# Patient Record
Sex: FEMALE | Race: BLACK OR AFRICAN AMERICAN | URBAN - NONMETROPOLITAN AREA
[De-identification: names, ages, dates, MRNs, and addresses within clinical notes are randomized per-mention and may not be internally consistent; named-entity substitution may affect disease eponyms.]

---

## 2018-08-21 PROBLEM — Z96.1: Noted: 2018-08-21

## 2018-08-21 PROBLEM — H31.013: Noted: 2018-08-21

## 2018-08-21 PROBLEM — H40.1134: Noted: 2018-08-21

## 2019-06-11 ENCOUNTER — IMPORTED ENCOUNTER (OUTPATIENT)
Dept: URBAN - NONMETROPOLITAN AREA CLINIC 1 | Facility: CLINIC | Age: 74
End: 2019-06-11

## 2019-06-11 PROCEDURE — 92134 CPTRZ OPH DX IMG PST SGM RTA: CPT

## 2019-06-11 NOTE — PATIENT DISCUSSION
s/p PCIOL-doing wellPOAG-IOP 21:21-OCT Virgil Tao 74 ordered 8/21/18 performed and reviewed with patient stable-watch OS ?  inferior notch/thinning-stopped Alphagan 2/19/18 possible allergic rxn with itch and redness-good IOP off of gtts continue to monitor-HVF 24-2 performed and reviewed with the patient. & OCT MAC

## 2019-06-18 ENCOUNTER — IMPORTED ENCOUNTER (OUTPATIENT)
Dept: URBAN - NONMETROPOLITAN AREA CLINIC 1 | Facility: CLINIC | Age: 74
End: 2019-06-18

## 2019-06-18 PROBLEM — Z96.1: Noted: 2019-06-18

## 2019-06-18 PROBLEM — H31.013: Noted: 2019-06-18

## 2019-06-18 PROBLEM — H35.373: Noted: 2019-06-18

## 2019-06-18 PROBLEM — H40.1134: Noted: 2019-06-18

## 2019-06-18 PROCEDURE — 92134 CPTRZ OPH DX IMG PST SGM RTA: CPT

## 2019-06-18 PROCEDURE — 92014 COMPRE OPH EXAM EST PT 1/>: CPT

## 2019-06-18 NOTE — PATIENT DISCUSSION
Macular Scars OU-OCT MAC reviewed w/pt-Continue to monitorERM OU-Discussed in detail w/pt-Continue to monitor. s/p PCIOL OU w/PCO -doing wellPOAG-IOP 20:20 06/18/19-watch OS ?  inferior notch/thinning-good IOP off of gtts continue to monitor-Order OCT ONH [id number]: 6 Mo IOP check VF OCT ONH Pach

## 2020-09-04 NOTE — PATIENT DISCUSSION
- Will refer to Dr. Angeles Beal for a careful exam in light of high myopia and history of complicated RD OS.  May need barrier laser prophylaxis

## 2020-09-04 NOTE — PATIENT DISCUSSION
- Presented with IOP 52 mmHg today. Clear cornea. Pale nerve head (likely due to history of RD) with extensive cupping. Suspect IOP has been extremely high for a long time. Pt called and stated that he needs a letter stating that he has diabetes for work, Please advise.

## 2020-09-25 NOTE — PATIENT DISCUSSION
- Presented 9/4 with IOP 52 mmHg initially. Clear cornea. Pale nerve head (likely due to history of RD) with extensive cupping. Suspect IOP was extremely high for a long time.

## 2020-10-13 NOTE — PATIENT DISCUSSION
Recommend f/u with Michael Rico to evaluate for conditions that can cause retinal hemorrhage, including but not limited to DM, HTN, and COAGULOPATHIES. LESS LIKELY NORMAL TENSION GLAUCOMA. NO PFH.

## 2022-04-10 ASSESSMENT — VISUAL ACUITY
OS_SC: 20/20
OD_SC: 20/20-

## 2022-04-10 ASSESSMENT — TONOMETRY
OS_IOP_MMHG: 20
OD_IOP_MMHG: 20

## 2022-11-09 ENCOUNTER — NEW PATIENT (OUTPATIENT)
Dept: RURAL CLINIC 2 | Facility: CLINIC | Age: 77
End: 2022-11-09

## 2022-11-09 DIAGNOSIS — Z96.1: ICD-10-CM

## 2022-11-09 DIAGNOSIS — H35.3131: ICD-10-CM

## 2022-11-09 DIAGNOSIS — H40.1131: ICD-10-CM

## 2022-11-09 DIAGNOSIS — H31.013: ICD-10-CM

## 2022-11-09 DIAGNOSIS — H52.4: ICD-10-CM

## 2022-11-09 DIAGNOSIS — H52.13: ICD-10-CM

## 2022-11-09 DIAGNOSIS — H26.493: ICD-10-CM

## 2022-11-09 PROCEDURE — 92015 DETERMINE REFRACTIVE STATE: CPT

## 2022-11-09 PROCEDURE — 99204 OFFICE O/P NEW MOD 45 MIN: CPT

## 2022-11-09 ASSESSMENT — TONOMETRY
OD_IOP_MMHG: 25
OS_IOP_MMHG: 25

## 2022-11-09 ASSESSMENT — VISUAL ACUITY
OD_CC: 20/20
OS_CC: 20/20

## 2022-11-09 NOTE — PATIENT DISCUSSION
History of allergies to a number of glaucoma meds.  Reports tolerating COSOPT ok.  Will start dorzolamide-timolol BID OU.  Sent to [facility].

## 2023-12-11 ENCOUNTER — ESTABLISHED PATIENT (OUTPATIENT)
Dept: RURAL CLINIC 2 | Facility: CLINIC | Age: 78
End: 2023-12-11

## 2023-12-11 DIAGNOSIS — Z96.1: ICD-10-CM

## 2023-12-11 DIAGNOSIS — H35.3131: ICD-10-CM

## 2023-12-11 DIAGNOSIS — H40.89: ICD-10-CM

## 2023-12-11 DIAGNOSIS — H31.013: ICD-10-CM

## 2023-12-11 DIAGNOSIS — H26.493: ICD-10-CM

## 2023-12-11 DIAGNOSIS — H40.1131: ICD-10-CM

## 2023-12-11 PROCEDURE — 99213 OFFICE O/P EST LOW 20 MIN: CPT

## 2023-12-11 PROCEDURE — 92083 EXTENDED VISUAL FIELD XM: CPT

## 2023-12-11 ASSESSMENT — VISUAL ACUITY
OS_CC: 20/20
OD_CC: 20/20

## 2023-12-11 ASSESSMENT — TONOMETRY
OS_IOP_MMHG: 21
OD_IOP_MMHG: 18

## 2024-06-10 ENCOUNTER — FOLLOW UP (OUTPATIENT)
Dept: RURAL CLINIC 2 | Facility: CLINIC | Age: 79
End: 2024-06-10

## 2024-06-10 DIAGNOSIS — H26.493: ICD-10-CM

## 2024-06-10 DIAGNOSIS — Z96.1: ICD-10-CM

## 2024-06-10 DIAGNOSIS — H40.1131: ICD-10-CM

## 2024-06-10 DIAGNOSIS — H35.3131: ICD-10-CM

## 2024-06-10 DIAGNOSIS — H31.013: ICD-10-CM

## 2024-06-10 DIAGNOSIS — H40.89: ICD-10-CM

## 2024-06-10 PROCEDURE — 92133 CPTRZD OPH DX IMG PST SGM ON: CPT

## 2024-06-10 PROCEDURE — 92014 COMPRE OPH EXAM EST PT 1/>: CPT

## 2024-06-10 ASSESSMENT — VISUAL ACUITY
OD_CC: 20/20-2
OS_CC: 20/25+2
OU_CC: 20/20

## 2024-06-10 ASSESSMENT — TONOMETRY
OS_IOP_MMHG: 18
OD_IOP_MMHG: 18

## 2024-12-09 ENCOUNTER — FOLLOW UP (OUTPATIENT)
Age: 79
End: 2024-12-09

## 2024-12-09 DIAGNOSIS — H40.89: ICD-10-CM

## 2024-12-09 DIAGNOSIS — H31.013: ICD-10-CM

## 2024-12-09 DIAGNOSIS — H26.493: ICD-10-CM

## 2024-12-09 DIAGNOSIS — H40.1131: ICD-10-CM

## 2024-12-09 DIAGNOSIS — H35.3131: ICD-10-CM

## 2024-12-09 DIAGNOSIS — Z96.1: ICD-10-CM

## 2024-12-09 PROCEDURE — 92083 EXTENDED VISUAL FIELD XM: CPT

## 2024-12-09 PROCEDURE — 99213 OFFICE O/P EST LOW 20 MIN: CPT

## 2025-06-03 ENCOUNTER — FOLLOW UP (OUTPATIENT)
Age: 80
End: 2025-06-03

## 2025-06-03 DIAGNOSIS — H35.3131: ICD-10-CM

## 2025-06-03 DIAGNOSIS — H40.89: ICD-10-CM

## 2025-06-03 DIAGNOSIS — Z96.1: ICD-10-CM

## 2025-06-03 DIAGNOSIS — H35.3133: ICD-10-CM

## 2025-06-03 DIAGNOSIS — H26.493: ICD-10-CM

## 2025-06-03 DIAGNOSIS — H31.013: ICD-10-CM

## 2025-06-03 DIAGNOSIS — H40.1131: ICD-10-CM

## 2025-06-03 PROCEDURE — 92133 CPTRZD OPH DX IMG PST SGM ON: CPT

## 2025-06-03 PROCEDURE — 92014 COMPRE OPH EXAM EST PT 1/>: CPT
